# Patient Record
(demographics unavailable — no encounter records)

---

## 2025-02-05 NOTE — PHYSICAL EXAM
[Normal] : soft, non-tender, non-distended, no masses palpated, no HSM and normal bowel sounds [de-identified] : ulceration lip, white center

## 2025-02-05 NOTE — ASSESSMENT
[FreeTextEntry1] : Follow up visit: PMH: Severe obesity (BMI >=40), Elevated hemoglobin A1c measurement, Elevated TSH, Obesity BMI (30.0-39.9) -BP is stable. Continue current management. - Check A1c. CBC, CMP, Free T4, TSH - Renew Phentermine HCl 30 mg -possible canker sore-advised salt water rinse and oragel   - RTO in 6 months     Pt verbalized understanding and will reach should any questions/concerns occur.

## 2025-02-05 NOTE — PHYSICAL EXAM
[Normal] : soft, non-tender, non-distended, no masses palpated, no HSM and normal bowel sounds [de-identified] : ulceration lip, white center

## 2025-02-05 NOTE — HISTORY OF PRESENT ILLNESS
[FreeTextEntry1] : Patient presents today for longitudinal care. [de-identified] : Patient is a 21yr old female who presents today for longitudinal care.  Patient is doing well overall. Patient confirms losing some more weight from last visit. Confirms being slightly satisfied (due to losing 21lbs since May 2024), requires more results. Denies watching diet as much as she should, recommended to eat healthier. Confirms unhealthy snacking when she is bored, recommended to avoid. Denies consumption of carbonated drinks. Confirms staying hydrated with water. Denies having time to exercise due to school and work. Confirms following with pills daily. Denies side effects on medications. Discussed increasing dosage of weight loss medication. Denies following with Metformin currently, reports following with two medications for hip pain due to injury. Denies palpitations. Confirms sleeping has improved since last visit. Pt reports experiencing discomfort in mouth over the weekend. Confirms pain, denies burning. Recommended to do salt-water rinses for relief.    Denies any CP, chest tightness or SOB.

## 2025-02-05 NOTE — HEALTH RISK ASSESSMENT
[No] : In the past 12 months have you used drugs other than those required for medical reasons? No [0] : 2) Feeling down, depressed, or hopeless: Not at all (0) [PHQ-2 Negative - No further assessment needed] : PHQ-2 Negative - No further assessment needed [Never] : Never [[id number]] : 0

## 2025-02-05 NOTE — ADDENDUM
[FreeTextEntry1] : I, Fletcher Galarza, acted as a scribe on behalf of Dr. Dylan Lakhani MD, on 02/05/2025.   All medical entries made by the scribe were at my, Dr. Dylan Lakhani MD, direction and personally dictated by me on 02/05/2025. I have reviewed the chart and agree that the record accurately reflects my personal performance of the history, physical exam, assessment and plan. I have also personally directed, reviewed, and agreed with the chart.

## 2025-02-05 NOTE — HISTORY OF PRESENT ILLNESS
[FreeTextEntry1] : Patient presents today for longitudinal care. [de-identified] : Patient is a 21yr old female who presents today for longitudinal care.  Patient is doing well overall. Patient confirms losing some more weight from last visit. Confirms being slightly satisfied (due to losing 21lbs since May 2024), requires more results. Denies watching diet as much as she should, recommended to eat healthier. Confirms unhealthy snacking when she is bored, recommended to avoid. Denies consumption of carbonated drinks. Confirms staying hydrated with water. Denies having time to exercise due to school and work. Confirms following with pills daily. Denies side effects on medications. Discussed increasing dosage of weight loss medication. Denies following with Metformin currently, reports following with two medications for hip pain due to injury. Denies palpitations. Confirms sleeping has improved since last visit. Pt reports experiencing discomfort in mouth over the weekend. Confirms pain, denies burning. Recommended to do salt-water rinses for relief.    Denies any CP, chest tightness or SOB.

## 2025-02-05 NOTE — HEALTH RISK ASSESSMENT
[No] : In the past 12 months have you used drugs other than those required for medical reasons? No [0] : 2) Feeling down, depressed, or hopeless: Not at all (0) [PHQ-2 Negative - No further assessment needed] : PHQ-2 Negative - No further assessment needed [Never] : Never [[id number]] : 0

## 2025-06-26 NOTE — ADDENDUM
[FreeTextEntry1] :  I, Latoya Long, acted as a scribe on behalf of Dr. Dylan Lakhani MD, on 06/26/2025.  All medical entries made by the scribe were at my, Dr. Dylan Lakhani MD, direction and personally dictated by me on 06/26/2025. I have reviewed the chart and agree that the record accurately reflects my personal performance of the history, physical exam, assessment and plan. I have also personally directed, reviewed, and agreed with the chart.

## 2025-06-26 NOTE — PHYSICAL EXAM
[Normal] : no posterior cervical lymphadenopathy and no anterior cervical lymphadenopathy [de-identified] : Dark circular patches in the inner thigh and posterior thigh

## 2025-06-26 NOTE — HEALTH RISK ASSESSMENT
[Good] : ~his/her~  mood as  good [No] : In the past 12 months have you used drugs other than those required for medical reasons? No [0] : 1) Little interest or pleasure doing things: Not at all (0) [PHQ-2 Negative - No further assessment needed] : PHQ-2 Negative - No further assessment needed [Time Spent: ___ Minutes] : I spent [unfilled] minutes performing a depression screening for this patient. [Never] : Never [NO] : No [FreeTextEntry1] : health maintenance  [[id number]] : 0 [MammogramComments] :  n/a  [PapSmearComments] :  n/a  [BoneDensityComments] :  n/a   [ColonoscopyComments] :  n/a

## 2025-06-26 NOTE — ASSESSMENT
[Vaccines Reviewed] : Immunizations reviewed today. Please see immunization details in the vaccine log within the immunization flowsheet.  [FreeTextEntry1] : Annual Physical Exam: Discoloration of skin of face, Rash, PMH: Severe obesity (BMI >= 40) - BP is stable. Continue current management. - Check A1c, CBC, CMP, Lipid profile, TSH, Urinalysis, Vitamin levels - Start Triamcinolone Acetonide 0.5% External Cream-Possible dermatitis eczematous dermatitis?  Advised tretinoin cream for the facial blemishes also to have dermatology consult for further recommendation - Renew Phentermine HCl (30mg) - DERM Referral    - RTO annually or as needed.   Pt verbalized understanding and will reach out should any questions/concerns occur.

## 2025-06-26 NOTE — HISTORY OF PRESENT ILLNESS
[FreeTextEntry1] : Patient is present today to establish care and for a comprehensive Annual Physical Exam.  [de-identified] : Patient is a 21yr old F who is present today to establish care and for a comprehensive Annual Physical Exam. Has not been watching diet as closely has been snacking.  Has noticed rashes on the inner thigh abdomen and outer thigh.  Also said not associated with itchiness.  Has been sweating.  Denies any new detergents.  Rashes have not changed in size or shape.  Also has discoloration of the face.

## 2025-07-23 NOTE — ASSESSMENT
[FreeTextEntry1] : #Nummular eczema, trunk and extremities, mild #Post-inflammatory hyperpigmentation  Chronic condition, flaring  - Discussed diagnosis, natural course and treatment plan for disease flares and maintenance strategies to prevent future flares  - Dry skin care reviewed including use of fragrance-free products and liberal OTC emollient use BID. Avoid hot showers.  - START Tacrolimus 0.1% BID to rough areas, SED including stinging, may mix with Vaseline  #Seborrheic dermatitis, face and scalp, mild  Chronic condition; flaring  - Reviewed the benign nature but chronic nature of this condition  - START ketoconazole 2% shampoo to scalp, apply 2-3x per week, leave in for 5 mins at a time and rinse. Can also use as face wash 2-3x week, avoiding eyes.  - START ketoconazole 2% cream BID to face.  - Recommend wearing hats and sun protective clothing or OTC sunscreen products (SPF 30+, broad band, EltaMD/La Roche Posay/Neutrogena/Black girl sunscreen/Supergoop) daily on your face and entire body (apply sunscreen at least 30 minutes prior to going outside). Reapply sunscreen every 2 hours when outside.  RTC 6 months.

## 2025-07-23 NOTE — HISTORY OF PRESENT ILLNESS
[FreeTextEntry1] : NPV dry spot  [de-identified] : JAGRUTI BULLOCK is a 21-year-old who is presenting for evaluation of:  #Dry spots Gets dry and dark spots on stomach and legs for 3 months Asymptomatic, not itchy, not scaly Uses Palmar's cocoa butter and cocoa oil to moisturize  Used triamcinolone daily for 1 week w/o improvement  #Sunburns Itchy and painful raised skin on the back, the legs, arms, and chest - in Summer 2024 Happens when exposed to the sun for long period of time Using [employer] SPF30+ sunscreen, just applied in the morning Want a sunscreen recommedation   #Discoloration on face Has light discoloration on face since middle school, thats worsening Had acne in the past, has mild acne now Doesn't use sunscreen on face, occasionally uses black girl sunscreen Use CeraVe face wash, Vitamin C serum, Coates's skin therapy face oil, CeraVe moisturizing cream, collagen charles water spray   #Dandruff - Continues to have flaky, less with nizoral shampoo. No itching.  - Uses Nizoral anti-dandruff shampoo, washes hair 1x monthly  - Never picked up ketoconazole shampoo

## 2025-07-23 NOTE — HISTORY OF PRESENT ILLNESS
[FreeTextEntry1] : NPV dry spot  [de-identified] : JAGRUTI BULLOCK is a 21-year-old who is presenting for evaluation of:  #Dry spots Gets dry and dark spots on stomach and legs for 3 months Asymptomatic, not itchy, not scaly Uses Palmar's cocoa butter and cocoa oil to moisturize  Used triamcinolone daily for 1 week w/o improvement  #Sunburns Itchy and painful raised skin on the back, the legs, arms, and chest - in Summer 2024 Happens when exposed to the sun for long period of time Using [employer] SPF30+ sunscreen, just applied in the morning Want a sunscreen recommedation   #Discoloration on face Has light discoloration on face since middle school, thats worsening Had acne in the past, has mild acne now Doesn't use sunscreen on face, occasionally uses black girl sunscreen Use CeraVe face wash, Vitamin C serum, Coates's skin therapy face oil, CeraVe moisturizing cream, collagen charles water spray   #Dandruff - Continues to have flaky, less with nizoral shampoo. No itching.  - Uses Nizoral anti-dandruff shampoo, washes hair 1x monthly  - Never picked up ketoconazole shampoo

## 2025-07-23 NOTE — PHYSICAL EXAM
[Declined] : declined [FreeTextEntry3] : Diffuse scale on scalp Hyperpigmented macules and patches on abdomen and legs Hypopigmented patches on face